# Patient Record
Sex: FEMALE | Race: WHITE | NOT HISPANIC OR LATINO
[De-identification: names, ages, dates, MRNs, and addresses within clinical notes are randomized per-mention and may not be internally consistent; named-entity substitution may affect disease eponyms.]

---

## 2017-01-03 ENCOUNTER — RECORDS - HEALTHEAST (OUTPATIENT)
Dept: ADMINISTRATIVE | Facility: OTHER | Age: 77
End: 2017-01-03

## 2017-01-04 ENCOUNTER — RECORDS - HEALTHEAST (OUTPATIENT)
Dept: ADMINISTRATIVE | Facility: OTHER | Age: 77
End: 2017-01-04

## 2017-01-24 ENCOUNTER — AMBULATORY - HEALTHEAST (OUTPATIENT)
Dept: NURSING | Facility: CLINIC | Age: 77
End: 2017-01-24

## 2017-02-15 ENCOUNTER — RECORDS - HEALTHEAST (OUTPATIENT)
Dept: ADMINISTRATIVE | Facility: OTHER | Age: 77
End: 2017-02-15

## 2017-02-20 ENCOUNTER — RECORDS - HEALTHEAST (OUTPATIENT)
Dept: ADMINISTRATIVE | Facility: OTHER | Age: 77
End: 2017-02-20

## 2017-02-23 ENCOUNTER — COMMUNICATION - HEALTHEAST (OUTPATIENT)
Dept: SCHEDULING | Facility: CLINIC | Age: 77
End: 2017-02-23

## 2017-03-03 ENCOUNTER — COMMUNICATION - HEALTHEAST (OUTPATIENT)
Dept: FAMILY MEDICINE | Facility: CLINIC | Age: 77
End: 2017-03-03

## 2017-03-09 ENCOUNTER — OFFICE VISIT - HEALTHEAST (OUTPATIENT)
Dept: FAMILY MEDICINE | Facility: CLINIC | Age: 77
End: 2017-03-09

## 2017-03-09 DIAGNOSIS — F02.81 ALZHEIMER'S DEMENTIA WITH BEHAVIORAL DISTURBANCE, UNSPECIFIED TIMING OF DEMENTIA ONSET: ICD-10-CM

## 2017-03-09 DIAGNOSIS — G30.9 ALZHEIMER'S DEMENTIA WITH BEHAVIORAL DISTURBANCE, UNSPECIFIED TIMING OF DEMENTIA ONSET: ICD-10-CM

## 2017-03-09 DIAGNOSIS — Z87.898 HISTORY OF SYNCOPE: ICD-10-CM

## 2017-03-09 DIAGNOSIS — E53.8 OTHER B-COMPLEX DEFICIENCIES: ICD-10-CM

## 2017-03-09 ASSESSMENT — MIFFLIN-ST. JEOR: SCORE: 1118.95

## 2017-03-10 ENCOUNTER — RECORDS - HEALTHEAST (OUTPATIENT)
Dept: ADMINISTRATIVE | Facility: OTHER | Age: 77
End: 2017-03-10

## 2017-03-14 ENCOUNTER — AMBULATORY - HEALTHEAST (OUTPATIENT)
Dept: NURSING | Facility: CLINIC | Age: 77
End: 2017-03-14

## 2017-03-14 DIAGNOSIS — E53.8 OTHER B-COMPLEX DEFICIENCIES: ICD-10-CM

## 2017-03-28 ENCOUNTER — COMMUNICATION - HEALTHEAST (OUTPATIENT)
Dept: FAMILY MEDICINE | Facility: CLINIC | Age: 77
End: 2017-03-28

## 2017-04-11 ENCOUNTER — AMBULATORY - HEALTHEAST (OUTPATIENT)
Dept: NURSING | Facility: CLINIC | Age: 77
End: 2017-04-11

## 2017-05-16 ENCOUNTER — AMBULATORY - HEALTHEAST (OUTPATIENT)
Dept: NURSING | Facility: CLINIC | Age: 77
End: 2017-05-16

## 2017-06-15 ENCOUNTER — COMMUNICATION - HEALTHEAST (OUTPATIENT)
Dept: FAMILY MEDICINE | Facility: CLINIC | Age: 77
End: 2017-06-15

## 2017-06-15 ENCOUNTER — AMBULATORY - HEALTHEAST (OUTPATIENT)
Dept: NURSING | Facility: CLINIC | Age: 77
End: 2017-06-15

## 2017-06-16 ENCOUNTER — COMMUNICATION - HEALTHEAST (OUTPATIENT)
Dept: FAMILY MEDICINE | Facility: CLINIC | Age: 77
End: 2017-06-16

## 2017-06-20 ENCOUNTER — COMMUNICATION - HEALTHEAST (OUTPATIENT)
Dept: FAMILY MEDICINE | Facility: CLINIC | Age: 77
End: 2017-06-20

## 2017-06-21 ENCOUNTER — OFFICE VISIT - HEALTHEAST (OUTPATIENT)
Dept: FAMILY MEDICINE | Facility: CLINIC | Age: 77
End: 2017-06-21

## 2017-06-21 DIAGNOSIS — G30.9 ALZHEIMER'S DEMENTIA WITH BEHAVIORAL DISTURBANCE, UNSPECIFIED TIMING OF DEMENTIA ONSET: ICD-10-CM

## 2017-06-21 DIAGNOSIS — F02.81 ALZHEIMER'S DEMENTIA WITH BEHAVIORAL DISTURBANCE, UNSPECIFIED TIMING OF DEMENTIA ONSET: ICD-10-CM

## 2017-06-21 ASSESSMENT — MIFFLIN-ST. JEOR: SCORE: 1181.32

## 2017-06-26 ENCOUNTER — RECORDS - HEALTHEAST (OUTPATIENT)
Dept: ADMINISTRATIVE | Facility: OTHER | Age: 77
End: 2017-06-26

## 2017-06-26 ENCOUNTER — COMMUNICATION - HEALTHEAST (OUTPATIENT)
Dept: FAMILY MEDICINE | Facility: CLINIC | Age: 77
End: 2017-06-26

## 2017-07-18 ENCOUNTER — AMBULATORY - HEALTHEAST (OUTPATIENT)
Dept: NURSING | Facility: CLINIC | Age: 77
End: 2017-07-18

## 2017-07-24 ENCOUNTER — RECORDS - HEALTHEAST (OUTPATIENT)
Dept: ADMINISTRATIVE | Facility: OTHER | Age: 77
End: 2017-07-24

## 2017-07-24 ENCOUNTER — COMMUNICATION - HEALTHEAST (OUTPATIENT)
Dept: FAMILY MEDICINE | Facility: CLINIC | Age: 77
End: 2017-07-24

## 2017-08-16 ENCOUNTER — RECORDS - HEALTHEAST (OUTPATIENT)
Dept: ADMINISTRATIVE | Facility: OTHER | Age: 77
End: 2017-08-16

## 2017-08-30 ENCOUNTER — AMBULATORY - HEALTHEAST (OUTPATIENT)
Dept: NURSING | Facility: CLINIC | Age: 77
End: 2017-08-30

## 2017-10-06 ENCOUNTER — COMMUNICATION - HEALTHEAST (OUTPATIENT)
Dept: FAMILY MEDICINE | Facility: CLINIC | Age: 77
End: 2017-10-06

## 2017-10-09 ENCOUNTER — COMMUNICATION - HEALTHEAST (OUTPATIENT)
Dept: FAMILY MEDICINE | Facility: CLINIC | Age: 77
End: 2017-10-09

## 2017-10-10 ENCOUNTER — COMMUNICATION - HEALTHEAST (OUTPATIENT)
Dept: FAMILY MEDICINE | Facility: CLINIC | Age: 77
End: 2017-10-10

## 2017-10-10 ENCOUNTER — RECORDS - HEALTHEAST (OUTPATIENT)
Dept: ADMINISTRATIVE | Facility: OTHER | Age: 77
End: 2017-10-10

## 2017-10-24 ENCOUNTER — AMBULATORY - HEALTHEAST (OUTPATIENT)
Dept: NURSING | Facility: CLINIC | Age: 77
End: 2017-10-24

## 2017-11-08 ENCOUNTER — RECORDS - HEALTHEAST (OUTPATIENT)
Dept: ADMINISTRATIVE | Facility: OTHER | Age: 77
End: 2017-11-08

## 2017-11-16 ENCOUNTER — COMMUNICATION - HEALTHEAST (OUTPATIENT)
Dept: FAMILY MEDICINE | Facility: CLINIC | Age: 77
End: 2017-11-16

## 2017-11-17 ENCOUNTER — RECORDS - HEALTHEAST (OUTPATIENT)
Dept: ADMINISTRATIVE | Facility: OTHER | Age: 77
End: 2017-11-17

## 2017-11-30 ENCOUNTER — OFFICE VISIT - HEALTHEAST (OUTPATIENT)
Dept: FAMILY MEDICINE | Facility: CLINIC | Age: 77
End: 2017-11-30

## 2017-11-30 ENCOUNTER — RECORDS - HEALTHEAST (OUTPATIENT)
Dept: GENERAL RADIOLOGY | Facility: CLINIC | Age: 77
End: 2017-11-30

## 2017-11-30 DIAGNOSIS — G30.9 ALZHEIMER'S DEMENTIA (H): ICD-10-CM

## 2017-11-30 DIAGNOSIS — F02.80 ALZHEIMER'S DEMENTIA (H): ICD-10-CM

## 2017-11-30 DIAGNOSIS — M65.311 TRIGGER THUMB OF RIGHT HAND: ICD-10-CM

## 2017-11-30 DIAGNOSIS — M79.644 PAIN IN RIGHT FINGER(S): ICD-10-CM

## 2017-11-30 DIAGNOSIS — M79.644 PAIN OF RIGHT THUMB: ICD-10-CM

## 2017-11-30 ASSESSMENT — MIFFLIN-ST. JEOR: SCORE: 1156.09

## 2017-12-04 ENCOUNTER — COMMUNICATION - HEALTHEAST (OUTPATIENT)
Dept: FAMILY MEDICINE | Facility: CLINIC | Age: 77
End: 2017-12-04

## 2017-12-05 ENCOUNTER — RECORDS - HEALTHEAST (OUTPATIENT)
Dept: ADMINISTRATIVE | Facility: OTHER | Age: 77
End: 2017-12-05

## 2018-01-18 ENCOUNTER — RECORDS - HEALTHEAST (OUTPATIENT)
Dept: ADMINISTRATIVE | Facility: OTHER | Age: 78
End: 2018-01-18

## 2018-01-18 ENCOUNTER — COMMUNICATION - HEALTHEAST (OUTPATIENT)
Dept: FAMILY MEDICINE | Facility: CLINIC | Age: 78
End: 2018-01-18

## 2018-04-09 ENCOUNTER — COMMUNICATION - HEALTHEAST (OUTPATIENT)
Dept: FAMILY MEDICINE | Facility: CLINIC | Age: 78
End: 2018-04-09

## 2018-05-31 ENCOUNTER — RECORDS - HEALTHEAST (OUTPATIENT)
Dept: ADMINISTRATIVE | Facility: OTHER | Age: 78
End: 2018-05-31

## 2018-07-05 ENCOUNTER — COMMUNICATION - HEALTHEAST (OUTPATIENT)
Dept: FAMILY MEDICINE | Facility: CLINIC | Age: 78
End: 2018-07-05

## 2018-07-12 ENCOUNTER — RECORDS - HEALTHEAST (OUTPATIENT)
Dept: ADMINISTRATIVE | Facility: OTHER | Age: 78
End: 2018-07-12

## 2018-08-21 ENCOUNTER — RECORDS - HEALTHEAST (OUTPATIENT)
Dept: ADMINISTRATIVE | Facility: OTHER | Age: 78
End: 2018-08-21

## 2018-08-21 ENCOUNTER — OFFICE VISIT - HEALTHEAST (OUTPATIENT)
Dept: FAMILY MEDICINE | Facility: CLINIC | Age: 78
End: 2018-08-21

## 2018-08-21 DIAGNOSIS — N39.498 OTHER URINARY INCONTINENCE: ICD-10-CM

## 2018-08-21 DIAGNOSIS — G30.1 LATE ONSET ALZHEIMER'S DISEASE WITH BEHAVIORAL DISTURBANCE (H): ICD-10-CM

## 2018-08-21 DIAGNOSIS — Z51.81 MEDICATION MONITORING ENCOUNTER: ICD-10-CM

## 2018-08-21 DIAGNOSIS — F02.818 LATE ONSET ALZHEIMER'S DISEASE WITH BEHAVIORAL DISTURBANCE (H): ICD-10-CM

## 2018-08-21 DIAGNOSIS — R68.89 FLUCTUATION OF WEIGHT: ICD-10-CM

## 2018-08-21 DIAGNOSIS — R60.0 EDEMA OF BOTH LEGS: ICD-10-CM

## 2018-08-21 DIAGNOSIS — Z00.00 MEDICARE ANNUAL WELLNESS VISIT, SUBSEQUENT: ICD-10-CM

## 2018-08-21 LAB
ALBUMIN SERPL-MCNC: 3.9 G/DL (ref 3.5–5)
ALP SERPL-CCNC: 56 U/L (ref 45–120)
ALT SERPL W P-5'-P-CCNC: 17 U/L (ref 0–45)
ANION GAP SERPL CALCULATED.3IONS-SCNC: 8 MMOL/L (ref 5–18)
AST SERPL W P-5'-P-CCNC: 18 U/L (ref 0–40)
BILIRUB SERPL-MCNC: 0.5 MG/DL (ref 0–1)
BUN SERPL-MCNC: 11 MG/DL (ref 8–28)
CALCIUM SERPL-MCNC: 9.7 MG/DL (ref 8.5–10.5)
CHLORIDE BLD-SCNC: 105 MMOL/L (ref 98–107)
CO2 SERPL-SCNC: 27 MMOL/L (ref 22–31)
CREAT SERPL-MCNC: 0.8 MG/DL (ref 0.6–1.1)
ERYTHROCYTE [DISTWIDTH] IN BLOOD BY AUTOMATED COUNT: 14.5 % (ref 11–14.5)
GFR SERPL CREATININE-BSD FRML MDRD: >60 ML/MIN/1.73M2
GLUCOSE BLD-MCNC: 107 MG/DL (ref 70–125)
HCT VFR BLD AUTO: 35.5 % (ref 35–47)
HGB BLD-MCNC: 12 G/DL (ref 12–16)
MCH RBC QN AUTO: 28.5 PG (ref 27–34)
MCHC RBC AUTO-ENTMCNC: 33.8 G/DL (ref 32–36)
MCV RBC AUTO: 84 FL (ref 80–100)
PLATELET # BLD AUTO: 240 THOU/UL (ref 140–440)
PMV BLD AUTO: 7.8 FL (ref 7–10)
POTASSIUM BLD-SCNC: 4.4 MMOL/L (ref 3.5–5)
PROT SERPL-MCNC: 6.5 G/DL (ref 6–8)
RBC # BLD AUTO: 4.21 MILL/UL (ref 3.8–5.4)
SODIUM SERPL-SCNC: 140 MMOL/L (ref 136–145)
WBC: 4.3 THOU/UL (ref 4–11)

## 2018-08-21 RX ORDER — QUETIAPINE FUMARATE 100 MG/1
100 TABLET, FILM COATED ORAL AT BEDTIME
Status: SHIPPED | COMMUNITY
Start: 2018-08-21

## 2018-08-21 RX ORDER — TRAZODONE HYDROCHLORIDE 50 MG/1
50 TABLET, FILM COATED ORAL AT BEDTIME
Status: SHIPPED | COMMUNITY
Start: 2018-08-21

## 2018-08-21 RX ORDER — LORAZEPAM 0.5 MG/1
0.5 TABLET ORAL EVERY 12 HOURS PRN
Status: SHIPPED | COMMUNITY
Start: 2018-08-21

## 2018-08-21 ASSESSMENT — MIFFLIN-ST. JEOR: SCORE: 1099.95

## 2018-08-24 ENCOUNTER — COMMUNICATION - HEALTHEAST (OUTPATIENT)
Dept: FAMILY MEDICINE | Facility: CLINIC | Age: 78
End: 2018-08-24

## 2018-10-31 ENCOUNTER — COMMUNICATION - HEALTHEAST (OUTPATIENT)
Dept: FAMILY MEDICINE | Facility: CLINIC | Age: 78
End: 2018-10-31

## 2018-11-02 ENCOUNTER — COMMUNICATION - HEALTHEAST (OUTPATIENT)
Dept: FAMILY MEDICINE | Facility: CLINIC | Age: 78
End: 2018-11-02

## 2018-11-02 ENCOUNTER — RECORDS - HEALTHEAST (OUTPATIENT)
Dept: ADMINISTRATIVE | Facility: OTHER | Age: 78
End: 2018-11-02

## 2018-12-12 ENCOUNTER — COMMUNICATION - HEALTHEAST (OUTPATIENT)
Dept: FAMILY MEDICINE | Facility: CLINIC | Age: 78
End: 2018-12-12

## 2018-12-18 ENCOUNTER — RECORDS - HEALTHEAST (OUTPATIENT)
Dept: ADMINISTRATIVE | Facility: OTHER | Age: 78
End: 2018-12-18

## 2018-12-28 ENCOUNTER — OFFICE VISIT - HEALTHEAST (OUTPATIENT)
Dept: FAMILY MEDICINE | Facility: CLINIC | Age: 78
End: 2018-12-28

## 2018-12-28 DIAGNOSIS — G30.1 LATE ONSET ALZHEIMER'S DISEASE WITH BEHAVIORAL DISTURBANCE (H): ICD-10-CM

## 2018-12-28 DIAGNOSIS — R63.4 WEIGHT LOSS: ICD-10-CM

## 2018-12-28 DIAGNOSIS — F02.818 LATE ONSET ALZHEIMER'S DISEASE WITH BEHAVIORAL DISTURBANCE (H): ICD-10-CM

## 2018-12-28 LAB
ALBUMIN SERPL-MCNC: 3.9 G/DL (ref 3.5–5)
ALP SERPL-CCNC: 71 U/L (ref 45–120)
ALT SERPL W P-5'-P-CCNC: 16 U/L (ref 0–45)
ANION GAP SERPL CALCULATED.3IONS-SCNC: 8 MMOL/L (ref 5–18)
AST SERPL W P-5'-P-CCNC: 21 U/L (ref 0–40)
BILIRUB SERPL-MCNC: 0.6 MG/DL (ref 0–1)
BUN SERPL-MCNC: 16 MG/DL (ref 8–28)
CALCIUM SERPL-MCNC: 9.8 MG/DL (ref 8.5–10.5)
CHLORIDE BLD-SCNC: 101 MMOL/L (ref 98–107)
CO2 SERPL-SCNC: 29 MMOL/L (ref 22–31)
CREAT SERPL-MCNC: 0.74 MG/DL (ref 0.6–1.1)
ERYTHROCYTE [DISTWIDTH] IN BLOOD BY AUTOMATED COUNT: 12 % (ref 11–14.5)
GFR SERPL CREATININE-BSD FRML MDRD: >60 ML/MIN/1.73M2
GLUCOSE BLD-MCNC: 102 MG/DL (ref 70–125)
HCT VFR BLD AUTO: 35.9 % (ref 35–47)
HGB BLD-MCNC: 11.9 G/DL (ref 12–16)
MCH RBC QN AUTO: 29.8 PG (ref 27–34)
MCHC RBC AUTO-ENTMCNC: 33.2 G/DL (ref 32–36)
MCV RBC AUTO: 90 FL (ref 80–100)
PLATELET # BLD AUTO: 258 THOU/UL (ref 140–440)
PMV BLD AUTO: 7.3 FL (ref 7–10)
POTASSIUM BLD-SCNC: 4.3 MMOL/L (ref 3.5–5)
PROT SERPL-MCNC: 6.7 G/DL (ref 6–8)
RBC # BLD AUTO: 4 MILL/UL (ref 3.8–5.4)
SODIUM SERPL-SCNC: 138 MMOL/L (ref 136–145)
TSH SERPL DL<=0.005 MIU/L-ACNC: 2.72 UIU/ML (ref 0.3–5)
WBC: 4.5 THOU/UL (ref 4–11)

## 2018-12-28 RX ORDER — NUTRIT SUPP/INULIN/FOS/FIBER 0.05G-1.06
1 LIQUID (ML) ORAL 2 TIMES DAILY WITH MEALS
Qty: 60 CAN | Refills: 5 | Status: SHIPPED | OUTPATIENT
Start: 2018-12-28

## 2018-12-28 ASSESSMENT — MIFFLIN-ST. JEOR: SCORE: 1045.24

## 2018-12-31 ENCOUNTER — COMMUNICATION - HEALTHEAST (OUTPATIENT)
Dept: FAMILY MEDICINE | Facility: CLINIC | Age: 78
End: 2018-12-31

## 2019-01-16 ENCOUNTER — COMMUNICATION - HEALTHEAST (OUTPATIENT)
Dept: FAMILY MEDICINE | Facility: CLINIC | Age: 79
End: 2019-01-16

## 2019-01-18 ENCOUNTER — COMMUNICATION - HEALTHEAST (OUTPATIENT)
Dept: FAMILY MEDICINE | Facility: CLINIC | Age: 79
End: 2019-01-18

## 2019-02-14 ENCOUNTER — COMMUNICATION - HEALTHEAST (OUTPATIENT)
Dept: FAMILY MEDICINE | Facility: CLINIC | Age: 79
End: 2019-02-14

## 2019-02-15 ENCOUNTER — COMMUNICATION - HEALTHEAST (OUTPATIENT)
Dept: FAMILY MEDICINE | Facility: CLINIC | Age: 79
End: 2019-02-15

## 2019-02-15 DIAGNOSIS — G30.1 LATE ONSET ALZHEIMER'S DISEASE WITH BEHAVIORAL DISTURBANCE (H): ICD-10-CM

## 2019-02-15 DIAGNOSIS — F02.818 LATE ONSET ALZHEIMER'S DISEASE WITH BEHAVIORAL DISTURBANCE (H): ICD-10-CM

## 2019-02-19 ENCOUNTER — COMMUNICATION - HEALTHEAST (OUTPATIENT)
Dept: FAMILY MEDICINE | Facility: CLINIC | Age: 79
End: 2019-02-19

## 2019-02-19 ENCOUNTER — RECORDS - HEALTHEAST (OUTPATIENT)
Dept: ADMINISTRATIVE | Facility: OTHER | Age: 79
End: 2019-02-19

## 2019-02-21 ENCOUNTER — COMMUNICATION - HEALTHEAST (OUTPATIENT)
Dept: FAMILY MEDICINE | Facility: CLINIC | Age: 79
End: 2019-02-21

## 2019-02-26 ENCOUNTER — COMMUNICATION - HEALTHEAST (OUTPATIENT)
Dept: FAMILY MEDICINE | Facility: CLINIC | Age: 79
End: 2019-02-26

## 2019-02-26 ENCOUNTER — RECORDS - HEALTHEAST (OUTPATIENT)
Dept: ADMINISTRATIVE | Facility: OTHER | Age: 79
End: 2019-02-26

## 2019-02-28 ENCOUNTER — RECORDS - HEALTHEAST (OUTPATIENT)
Dept: ADMINISTRATIVE | Facility: OTHER | Age: 79
End: 2019-02-28

## 2019-03-01 ENCOUNTER — COMMUNICATION - HEALTHEAST (OUTPATIENT)
Dept: FAMILY MEDICINE | Facility: CLINIC | Age: 79
End: 2019-03-01

## 2019-03-04 ENCOUNTER — RECORDS - HEALTHEAST (OUTPATIENT)
Dept: ADMINISTRATIVE | Facility: OTHER | Age: 79
End: 2019-03-04

## 2019-03-04 ENCOUNTER — COMMUNICATION - HEALTHEAST (OUTPATIENT)
Dept: FAMILY MEDICINE | Facility: CLINIC | Age: 79
End: 2019-03-04

## 2019-03-05 ENCOUNTER — COMMUNICATION - HEALTHEAST (OUTPATIENT)
Dept: FAMILY MEDICINE | Facility: CLINIC | Age: 79
End: 2019-03-05

## 2019-03-05 ENCOUNTER — RECORDS - HEALTHEAST (OUTPATIENT)
Dept: ADMINISTRATIVE | Facility: OTHER | Age: 79
End: 2019-03-05

## 2019-03-07 ENCOUNTER — AMBULATORY - HEALTHEAST (OUTPATIENT)
Dept: OTHER | Facility: CLINIC | Age: 79
End: 2019-03-07

## 2019-03-19 ENCOUNTER — COMMUNICATION - HEALTHEAST (OUTPATIENT)
Dept: FAMILY MEDICINE | Facility: CLINIC | Age: 79
End: 2019-03-19

## 2019-03-19 ENCOUNTER — RECORDS - HEALTHEAST (OUTPATIENT)
Dept: ADMINISTRATIVE | Facility: OTHER | Age: 79
End: 2019-03-19

## 2019-03-20 ENCOUNTER — COMMUNICATION - HEALTHEAST (OUTPATIENT)
Dept: ADMINISTRATIVE | Facility: CLINIC | Age: 79
End: 2019-03-20

## 2019-04-23 ENCOUNTER — COMMUNICATION - HEALTHEAST (OUTPATIENT)
Dept: FAMILY MEDICINE | Facility: CLINIC | Age: 79
End: 2019-04-23

## 2019-05-06 ENCOUNTER — RECORDS - HEALTHEAST (OUTPATIENT)
Dept: LAB | Facility: CLINIC | Age: 79
End: 2019-05-06

## 2019-05-07 LAB
25(OH)D3 SERPL-MCNC: 39.5 NG/ML (ref 30–80)
ALBUMIN SERPL-MCNC: 3.6 G/DL (ref 3.5–5)
ALP SERPL-CCNC: 65 U/L (ref 45–120)
ALT SERPL W P-5'-P-CCNC: 20 U/L (ref 0–45)
ANION GAP SERPL CALCULATED.3IONS-SCNC: 7 MMOL/L (ref 5–18)
AST SERPL W P-5'-P-CCNC: 30 U/L (ref 0–40)
BILIRUB SERPL-MCNC: 0.4 MG/DL (ref 0–1)
BUN SERPL-MCNC: 23 MG/DL (ref 8–28)
CALCIUM SERPL-MCNC: 9.8 MG/DL (ref 8.5–10.5)
CHLORIDE BLD-SCNC: 108 MMOL/L (ref 98–107)
CO2 SERPL-SCNC: 28 MMOL/L (ref 22–31)
CREAT SERPL-MCNC: 0.81 MG/DL (ref 0.6–1.1)
ERYTHROCYTE [DISTWIDTH] IN BLOOD BY AUTOMATED COUNT: 13.3 % (ref 11–14.5)
GFR SERPL CREATININE-BSD FRML MDRD: >60 ML/MIN/1.73M2
GLUCOSE BLD-MCNC: 103 MG/DL (ref 70–125)
HCT VFR BLD AUTO: 35 % (ref 35–47)
HGB BLD-MCNC: 10.9 G/DL (ref 12–16)
MCH RBC QN AUTO: 29.1 PG (ref 27–34)
MCHC RBC AUTO-ENTMCNC: 31.1 G/DL (ref 32–36)
MCV RBC AUTO: 93 FL (ref 80–100)
PLATELET # BLD AUTO: 236 THOU/UL (ref 140–440)
PMV BLD AUTO: 10.7 FL (ref 8.5–12.5)
POTASSIUM BLD-SCNC: 4 MMOL/L (ref 3.5–5)
PROT SERPL-MCNC: 6.6 G/DL (ref 6–8)
RBC # BLD AUTO: 3.75 MILL/UL (ref 3.8–5.4)
SODIUM SERPL-SCNC: 143 MMOL/L (ref 136–145)
TSH SERPL DL<=0.005 MIU/L-ACNC: 1.54 UIU/ML (ref 0.3–5)
VIT B12 SERPL-MCNC: 1042 PG/ML (ref 213–816)
WBC: 3.8 THOU/UL (ref 4–11)

## 2019-10-09 ENCOUNTER — RECORDS - HEALTHEAST (OUTPATIENT)
Dept: LAB | Facility: CLINIC | Age: 79
End: 2019-10-09

## 2019-10-09 LAB
ANION GAP SERPL CALCULATED.3IONS-SCNC: 6 MMOL/L (ref 5–18)
BUN SERPL-MCNC: 20 MG/DL (ref 8–28)
CALCIUM SERPL-MCNC: 9.7 MG/DL (ref 8.5–10.5)
CHLORIDE BLD-SCNC: 107 MMOL/L (ref 98–107)
CO2 SERPL-SCNC: 30 MMOL/L (ref 22–31)
CREAT SERPL-MCNC: 0.76 MG/DL (ref 0.6–1.1)
ERYTHROCYTE [DISTWIDTH] IN BLOOD BY AUTOMATED COUNT: 13.5 % (ref 11–14.5)
GFR SERPL CREATININE-BSD FRML MDRD: >60 ML/MIN/1.73M2
GLUCOSE BLD-MCNC: 105 MG/DL (ref 70–125)
HCT VFR BLD AUTO: 38.4 % (ref 35–47)
HGB BLD-MCNC: 11.6 G/DL (ref 12–16)
MCH RBC QN AUTO: 28.9 PG (ref 27–34)
MCHC RBC AUTO-ENTMCNC: 30.2 G/DL (ref 32–36)
MCV RBC AUTO: 96 FL (ref 80–100)
PLATELET # BLD AUTO: 239 THOU/UL (ref 140–440)
PMV BLD AUTO: 10.9 FL (ref 8.5–12.5)
POTASSIUM BLD-SCNC: 4.1 MMOL/L (ref 3.5–5)
RBC # BLD AUTO: 4.02 MILL/UL (ref 3.8–5.4)
SODIUM SERPL-SCNC: 143 MMOL/L (ref 136–145)
VIT B12 SERPL-MCNC: 637 PG/ML (ref 213–816)
WBC: 4.9 THOU/UL (ref 4–11)

## 2019-12-06 ENCOUNTER — COMMUNICATION - HEALTHEAST (OUTPATIENT)
Dept: FAMILY MEDICINE | Facility: CLINIC | Age: 79
End: 2019-12-06

## 2020-05-09 ENCOUNTER — RECORDS - HEALTHEAST (OUTPATIENT)
Dept: LAB | Facility: CLINIC | Age: 80
End: 2020-05-09

## 2020-05-09 LAB
ALBUMIN UR-MCNC: ABNORMAL MG/DL
APPEARANCE UR: ABNORMAL
BACTERIA #/AREA URNS HPF: ABNORMAL HPF
BILIRUB UR QL STRIP: NEGATIVE
COLOR UR AUTO: YELLOW
GLUCOSE UR STRIP-MCNC: NEGATIVE MG/DL
HGB UR QL STRIP: NEGATIVE
KETONES UR STRIP-MCNC: ABNORMAL MG/DL
LEUKOCYTE ESTERASE UR QL STRIP: ABNORMAL
MUCOUS THREADS #/AREA URNS LPF: ABNORMAL LPF
NITRATE UR QL: NEGATIVE
PH UR STRIP: 6.5 [PH] (ref 4.5–8)
RBC #/AREA URNS AUTO: ABNORMAL HPF
SP GR UR STRIP: 1.01 (ref 1–1.03)
SQUAMOUS #/AREA URNS AUTO: ABNORMAL LPF
TRANS CELLS #/AREA URNS HPF: ABNORMAL LPF
UROBILINOGEN UR STRIP-ACNC: ABNORMAL
WBC #/AREA URNS AUTO: ABNORMAL HPF

## 2020-05-11 LAB — BACTERIA SPEC CULT: ABNORMAL

## 2021-05-28 NOTE — TELEPHONE ENCOUNTER
Medication Request  Medication name:   LORazepam (ATIVAN) 0.5 MG tablet        Sig - Route: Take 0.5 mg by mouth every 12 (twelve) hours as needed for anxiety. - Oral    Class: Historical Med        Pharmacy Name and Location: [facility]  Reason for request: Fax received from pharmacy requesting refill for the above medication  When did you use medication last?:  Unknown- listed as historical med  Patient offered appointment:  N/A  Okay to leave a detailed message: no-speak to pharmacy staff

## 2021-05-30 ENCOUNTER — RECORDS - HEALTHEAST (OUTPATIENT)
Dept: ADMINISTRATIVE | Facility: CLINIC | Age: 81
End: 2021-05-30

## 2021-05-30 VITALS — WEIGHT: 132.38 LBS | BODY MASS INDEX: 20.06 KG/M2 | HEIGHT: 68 IN

## 2021-05-31 VITALS — BODY MASS INDEX: 21.44 KG/M2 | HEIGHT: 68 IN | WEIGHT: 141.44 LBS

## 2021-05-31 VITALS — HEIGHT: 68 IN | BODY MASS INDEX: 22.28 KG/M2 | WEIGHT: 147 LBS

## 2021-06-01 VITALS — BODY MASS INDEX: 19.56 KG/M2 | WEIGHT: 129.06 LBS | HEIGHT: 68 IN

## 2021-06-02 VITALS — WEIGHT: 117 LBS | HEIGHT: 68 IN | BODY MASS INDEX: 17.73 KG/M2

## 2021-06-09 NOTE — PROGRESS NOTES
Assessment:       1. History of syncope     2. Alzheimer's dementia with behavioral disturbance, unspecified timing of dementia onset     3. Vitamin B12 Deficiency             Plan:        We reviewed the records from her hospital stay, and they will continue monitoring for additional signs/symptoms. Blood pressure is normal. We reviewed her current medications and she will continue the same pending additional lab results.  We reviewed dietary recommendations, including low salt and high fiber diet, and recommendations for regular exercise/activity. They will plan to follow up in 4-6 mos for repeat fasting labs and med check, sooner if any difficulties.         Subjective:          Patient is here for follow-up of recent hospital admission for a syncopal episode. Her  reports that she had called out while in the bathroom and he came in to find her on the floor in front of the doorway. She had been started on low dose seroquel a few days prior and had taken a dose shortly before the episode. They presented to the ER and she had a positive D-dimer and a negative head CT and CT angio of chest. She was admitted for overnight observation, serial enzymes, and PT and OT evaluations. She was discharged after an unremarkable workup. As far as her dementia, her  reports that she has gotten progressively worse. She will be attending a day program 3x weekly. She has had no unusual symptoms since being back home. They are looking into long-term memory care placement.       Associated signs and symptoms: none. Denies chest pain, dyspnea, peripheral edema and tiredness/fatigue. Weight trend: fluctuating a bit. She is currently taking aricept and namenda. Current side effects include: none. She was instructed to stop the seroquel at hospital d/c. Previous history of cardiac disease includes: none.    Review of Systems  Pertinent items are noted in HPI.        Objective:      Visit Vitals     /58     Pulse 84      "Ht 5' 8\" (1.727 m)     Wt 132 lb 6 oz (60 kg)     BMI 20.13 kg/m2      General:    Alert, cooperative, no distress   Head:    Normocephalic, without obvious abnormality, atraumatic   Eyes:    PERRL, conjunctiva/corneas clear, EOM's intact    Ears:    Normal TM's and external ear canals, both ears   Nose:   Nares normal, mucosa normal, no drainage or sinus tenderness   Throat:   Lips, mucosa, and tongue normal; teeth and gums normal   Neck:   Supple, symmetrical,  no adenopathy;  thyroid:  normal   Back:     Symmetric, ROM normal, no CVA tenderness   Lungs:     Clear to auscultation bilaterally, respirations unlabored   CV:    Regular rate and rhythm   Abdomen:     Soft, non-tender, no masses, no organomegaly   Extremities:   Extremities normal, atraumatic, no cyanosis or edema   Pulses:   2+ and symmetric all extremities   Skin:   Skin color, texture, turgor normal, no rashes or lesions   Neurologic:   normal strength and tone throughout          "

## 2021-06-11 NOTE — PROGRESS NOTES
PROGRESS NOTE         SUBJECTIVE:  Dedra Benjamin is a 77 y.o. female that presents with her  Saqib. Saqib sent a hand written letter to me the day before I was scheduled to see his wife today. He is very worried about the decline with his wife's memory loss related to her Alzheimer's. His neurologist is Dr. Cheney. Dr. Cheney is currently on vacation and will not be back for 3 weeks per the nurse that Saqib spoke with. The  is arranging to have his wife placed in a memory care unit. However, her bed will not be available for 5 to 6 weeks. In the meantime, she attends an adult  4 hours per day. She was going full time but due to her recent outbursts, increase in anxiety and inability of the staff to be able to safely manage the patient, they did have to limit her time at that facility during the day. Saqib is wondering if the patient's current Seroquel dose could be increased today so that the patient does not lose her spot in the adult  program. He is scheduled to meet with Dr. Cehney in August to discuss ongoing care for his wife. Patient's last fall was in February 2017.She completed an overnight stay in the hospital and has additional testing completed to rule out any acute bleeding etc. Patient cannot recall the year, month or current location she is at. She does not use any assistive devices when walking and follows simple commands.   Chief Complaint   Patient presents with     Medication Management         Patient Active Problem List   Diagnosis     Ganglion Of The Flexor Sheath Of The Right Thumb     Cognitive Functions Current Level Impaired Mild     Vitamin B12 Deficiency     Memory Lapses Or Loss     Localized Primary Osteoarthritis Of The Carpometacarpal Joint     Syncope     Alzheimer's dementia with behavioral disturbance, unspecified timing of dementia onset     Fall, initial encounter       Current Outpatient Prescriptions   Medication Sig Dispense Refill     cyanocobalamin 1,000  mcg/mL injection Inject 1,000 mcg into the shoulder, thigh, or buttocks every 30 (thirty) days.        donepezil (ARICEPT) 10 MG tablet Take 15 mg by mouth bedtime.       memantine (NAMENDA) 10 MG tablet Take 1 tablet (10 mg total) by mouth 2 (two) times a day.  0     multivitamin therapeutic (THERAGRAN) tablet Take 1 tablet by mouth daily.       QUEtiapine (SEROQUEL) 50 MG tablet Take 2 tablets (100 mg total) by mouth 3 (three) times a day. 540 tablet 0     Current Facility-Administered Medications   Medication Dose Route Frequency Provider Last Rate Last Dose     cyanocobalamin injection 1,000 mcg  1,000 mcg Intramuscular Q30 Days Ashwini Daley MD   1,000 mcg at 06/15/17 1112       History   Smoking Status     Never Smoker   Smokeless Tobacco     Not on file       REVIEW OF SYSTEMS: Excessive worries, restlessness, on edge, easily fatigues, irritability, disturbed sleep, decreased concentration, muscle tension, fear of health.      TOBACCO USE:  History   Smoking Status     Never Smoker   Smokeless Tobacco     Not on file     Social History     Social History     Marital status:      Spouse name: Saqib     Number of children: 2     Years of education: 16     Occupational History     retired      Social History Main Topics     Smoking status: Never Smoker     Smokeless tobacco: Not on file     Alcohol use Yes     Drug use: No     Sexual activity: Not Currently     Partners: Male     Other Topics Concern     Not on file     Social History Narrative         OBJECTIVE:     Well developed, well nourished, no acute distress.  HEENT: normocephalic/atraumatic, PERRLA/EOMI, TMs: Gray, normal light reflex, no nasal discharge.  Oral mucosa: no erythema/exudate  Neck: No LAD/masses/thyromegaly/bruits  Lungs: clear bilaterally in all lung fields  Heart: regular rate and rhythm, no murmurs/gallops/rubs  Abdomen: Normal bowel sounds in all quadrants, soft, non-tender, non-distended, no masses, neg  Coats's/McBurney's, no rebound/guarding  Neuro: A&O x 1, able to tell me her name,Strength 5/5, reflexes symmetric, sensory intact to light touch. DTR's intact. Negative Romberg.   Psych: Behavior distracted, intermittently engaged in conversation. Gets frustrated easily when she cannot figure something out. Re-directed with assist of .   Musculoskeletal: no gross deformities, MEREDITH's, transfers from chair to exam table with minimal assist  Skin: no rashes or lesions.               Vitals:    06/21/17 1357   BP: 106/62   Pulse: 72     Weight: 147 lb (66.7 kg)  Wt Readings from Last 3 Encounters:   06/21/17 147 lb (66.7 kg)   03/09/17 132 lb 6 oz (60 kg)   02/22/17 124 lb 4.8 oz (56.4 kg)     Body mass index is 22.52 kg/(m^2).      ASSESSMENT/PLAN:     I have had an Advance Directives discussion with the patient.  The following are part of a depression follow up plan for the patient:  under care of mental health counselor, coping support assessment and emotional support assessment  The following low BMI interventions were performed this visit: dietary management education, guidance, and counseling, patient's is normal today. No option for normal BMI in quality measure tab.     1. Alzheimer's dementia with behavioral disturbance, unspecified timing of dementia onset    - QUEtiapine (SEROQUEL) 50 MG tablet; Take 2 tablets (100 mg total) by mouth 3 (three) times a day.  Dispense: 540 tablet; Refill: 0      There are no Patient Instructions on file for this visit.  Medications Discontinued During This Encounter   Medication Reason     QUEtiapine (SEROQUEL) 25 MG tablet Dose adjustment     Follow up with Dr. Cheney from Neurology as scheduled in August/September for next medication check. Seroquel dose increased from 225 mg to 300 mg today due to patient's recent outbursts and inability to re-direct her at her adult  facility. (See above dose changes). Due to her recent behaviors, her hours per day have been  reduced from 8 hours down to 4 hours at the [facility]. Her behavior has somewhat improved since her hours have been reduced, however, staff at [facility] continue to be concerned with their safety, the other residents safety as well as the patient's  when she has an emotional outburst. When her behavior is manageable at [facility], she enjoys working on puzzles and sewing. Health Maintenance completed: Falls assessment and Advanced Directives discussed. Mini Cog performed today. Patient was unable to successfully draw clock with hands pointing to 4 p.m. Patient deshawn a clock with the time ranging from 12 to 9 p.m. She ended up drawing the number 100 in the middle. When asked if she could draw the hands pointing to 4 p.m, she deshawn an arrow outside of the clock pointing to the number 4. She was unable to recall any of the 3 words that I gave her.     The visit lasted a total of 25 minutes face to face with the patient.  Over 50% of the time spent counseling and educating the patient about the above content.      Ilene Ospina NP

## 2021-06-14 NOTE — PROGRESS NOTES
"Assessment:     1. Pain of right thumb  XR Finger Right 2 or More VWS    Ambulatory referral to Orthopedics   2. Trigger thumb of right hand  Ambulatory referral to Orthopedics   3. Alzheimer's dementia  Ambulatory referral to Orthopedics         Plan:          We reviewed the likely/potential etiology(ies) for her thumb symptoms and we will check a plain x-ray and refer to [facility] for presumed trigger finger and possible injetion. Xray shows no significant fracture or abnormalities. We reviewed use of OTC analgesics and she will call or return to clinic with any ongoing or worsening symptoms. She will otherwise will f/u in 2-3 mos for routine evaluation.       Subjective:      Dedra Benjamin is an 77 y.o. female who presents for evaluation of right thumb pain. Onset was gradual, starting about 1 month ago. The pain is moderate, worsens with movement, and is relieved by rest, although the patient has limited cognitive skills due to dementia. She denies any injury, but may not remember a fall, or may not have remembered to tell staff. There is no associated erythema, but the joint IP joint is moderately swollen compared to the other side. It sounds as though she may have some triggering symptoms vs clicking at times as is well. Evaluation to date: none. Treatment to date: nothing specific.    The following portions of the patient's history were reviewed and updated as appropriate: allergies, current medications, past family history, past medical history, past social history, past surgical history and problem list.    Review of Systems  Pertinent items are noted in HPI.     Objective:      /72  Pulse 72  Ht 5' 7.75\" (1.721 m)  Wt 141 lb 7 oz (64.2 kg)   BMI 21.66 kg/m2  Physical Exam:  GEN: Alert and oriented, NAD,  well nourished  SKIN:  Normal skin turgor, no lesions/rashes   HEENT: moist mucous membranes, no rhinorrhea.    NECK: Normal.  No adenopathy or thyromegaly.  CV: Regular rate and rhythm, no " murmurs.   LUNGS: Clear to auscultation bilaterally.    ABDOMEN: Soft, non-tender, non-distended, no masses   EXTREMITY: No edema, cyanosis  NEURO: Grossly normal.         Imaging:  Xr Finger Right 2 Or More Vws Result Date: 11/30/2017  XR FINGER RIGHT 2 OR MORE VWS 11/30/2017 4:39 PM INDICATION: thumb pain and sticking COMPARISON: None. FINDINGS: Normal alignment without a fracture. Degenerative changes are seen within the thumb, greatest along the radial aspect of the interphalangeal joint where there is severe narrowing and mild spur formation. This report was electronically interpreted by: Dr. Calli Colin MD ON 11/30/2017 at 17:51

## 2021-06-16 PROBLEM — R60.0 EDEMA OF BOTH LEGS: Status: ACTIVE | Noted: 2018-08-29

## 2021-06-16 NOTE — TELEPHONE ENCOUNTER
Telephone Encounter by Crystal Liriano at 1/21/2019 12:10 PM     Author: Crystal Liriano Service: -- Author Type: --    Filed: 1/21/2019 12:11 PM Encounter Date: 1/16/2019 Status: Signed    : Crystal Liriano       [facility] team  [phone]    PA has been initiated.

## 2021-06-16 NOTE — TELEPHONE ENCOUNTER
Telephone Encounter by Crystal Liriano at 1/22/2019 10:07 AM     Author: Crystal Liriano Service: -- Author Type: --    Filed: 1/22/2019 10:07 AM Encounter Date: 1/16/2019 Status: Signed    : Crystal Liriano APPROVED:    Approval start date: 1/21/19  Approval end date: 1/21/2021    Pharmacy has been notified of approval and will contact patient when medication is ready for pickup.

## 2021-06-19 NOTE — LETTER
Letter by Ashwini Daley MD at      Author: Ashwini Daley MD Service: -- Author Type: --    Filed:  Encounter Date: 3/20/2019 Status: (Other)        [facility] PATIENT ACCESS  [address] [facility] [address]  [phone]         Dedra Benjamin  [address]  [place] [address]        03/20/19    Dear Dedra Benjamin,     At [facility] we care about your health and well-being. Your primary care provider is committed to ensuring you receive high quality care and has chosen a network of specialists to assist in providing that care. Recently, Dr. Ashwini Daley referred you to [facility] for specialty care     It is important to your overall health to follow through with the recommendation from your provider. Please call [phone] at your earliest convenience for assistance in scheduling an appointment.  If you have already scheduled this appointment, please disregard this notice.  Thank you for choosing [facility] for your healthcare needs.       Sincerely,       [facility] Specialty Scheduling

## 2021-06-20 NOTE — PROGRESS NOTES
Assessment and Plan:     1. Medicare annual wellness visit, subsequent    2. Late onset Alzheimer's disease with behavioral disturbance    3. Edema of both legs     Recommend compression stockings or tubigrip for control.       4. Other urinary incontinence     We will check UA/UC to rule out infection. May be related to progression of the dementia.   - Urinalysis Macroscopic; Future  - Culture, Urine; Future    5. Fluctuation of weight     On review of her weights over the last few years, it appears she has bounced between     6. Medication monitoring encounter  - Comprehensive Metabolic Panel   On review of her weights over the last few years, it appears she has bounced between 124 and 145, so this does not appear to be unusual especially given her god oral intake. If the weight loss persists, we would proceed with further workup for malignancy.      6. Medication monitoring encounter  - HM2(CBC w/o Differential)    The patient's current medical problems were reviewed.    I have had an Advance Directives discussion with the patient.  The following health maintenance schedule was reviewed with the patient and provided in printed form in the after visit summary:   Health Maintenance   Topic Date Due     DXA SCAN  06/03/2005     FALL RISK ASSESSMENT  06/21/2018     INFLUENZA VACCINE RULE BASED (1) 08/01/2018     TD 18+ HE  01/03/2022     ADVANCE DIRECTIVES DISCUSSED WITH PATIENT  06/21/2022     PNEUMOCOCCAL POLYSACCHARIDE VACCINE AGE 65 AND OVER  Completed     PNEUMOCOCCAL CONJUGATE VACCINE FOR ADULTS (PCV13 OR PREVNAR)  Completed     ZOSTER VACCINE  Completed        Subjective:   Chief Complaint: Dedra Benjamin is an 78 y.o. female here for an Annual Wellness visit.   HPI:    Patient presents today with her  Saqib.     They note urinary frequency, intermittent incontinence for last few mos. No dysuria, hematuria. No abd or back pain.      Memory issues are stable. She is living in memory care at [facility]  living.      Stopped some medications due to fatigue/drowsiness. Stopped namenda, reduced daytime seroquel. More talkative since the changes.      Review of Systems:    Eats well  Weight is down 10 lb in last 6 mos per nursing  Please see above.  The rest of the review of systems are negative for all systems.    Patient Care Team:  Ashwini Daley MD as PCP - General     Patient Active Problem List   Diagnosis     Ganglion Of The Flexor Sheath Of The Right Thumb     Alzheimer's dementia with behavioral disturbance     Vitamin B12 Deficiency     Localized Primary Osteoarthritis Of The Carpometacarpal Joint     Alzheimer's dementia with behavioral disturbance, unspecified timing of dementia onset     Fall, initial encounter     Past Medical History:   Diagnosis Date     Alzheimer's dementia with behavioral disturbance       Past Surgical History:   Procedure Laterality Date     OR BIOPSY OF BREAST, INCISIONAL      Description: Biopsy Breast Open;  Recorded: 01/02/2012;  Comments: 1990     OR REMOVAL OF TONSILS,<11 Y/O      Description: Tonsillectomy;  Recorded: 01/03/2012;  Comments: Age 5      Family History   Problem Relation Age of Onset     Bone cancer Sister       Social History     Social History     Marital status:      Spouse name: Saqib     Number of children: 2     Years of education: 16     Occupational History     retired      Social History Main Topics     Smoking status: Never Smoker     Smokeless tobacco: Not on file     Alcohol use Yes     Drug use: No     Sexual activity: Not Currently     Partners: Male     Other Topics Concern     Not on file     Social History Narrative      Current Outpatient Prescriptions   Medication Sig Dispense Refill     cyanocobalamin 1,000 mcg/mL injection Inject 1,000 mcg into the shoulder, thigh, or buttocks every 30 (thirty) days.        donepezil (ARICEPT) 10 MG tablet Take 15 mg by mouth bedtime.       LORazepam (ATIVAN) 0.5 MG tablet Take 0.5 mg by mouth  "every 12 (twelve) hours as needed for anxiety.       multivitamin therapeutic (THERAGRAN) tablet Take 1 tablet by mouth daily.       QUEtiapine (SEROQUEL) 100 MG tablet Take 100 mg by mouth at bedtime. 50 mg am and midday, 100 mg hs       traZODone (DESYREL) 50 MG tablet Take 50 mg by mouth at bedtime.       No current facility-administered medications for this visit.       Objective:   Vital Signs:   Visit Vitals     BP 96/54     Pulse 72     Ht 5' 7.75\" (1.721 m)     Wt 129 lb 1 oz (58.5 kg)     Breastfeeding No     BMI 19.77 kg/m2        VisionScreening:  No exam data present     PHYSICAL EXAM  General: alert, pleasant, and no distress  Head: Normocephalic, without obvious abnormality, atraumatic  Eyes: conjunctivae and sclerae normal and pupils equal, round, reactive to   light and accomodation  Ears: normal TM's and external ear canals both ears  Nose: no discharge, no sinus tenderness  Throat: lips, mucosa, and tongue normal; teeth and gums normal  Neck: no adenopathy, supple, symmetrical, trachea midline and thyroid normal  Back: symmetric, ROM normal. No CVA tenderness.  Lungs: clear to auscultation bilaterally  Breasts: exam deferred  Heart : regular rate and rhythm and no murmurs  Abdomen:  bowel sounds normal, no masses palpable and soft, non-tender  Pelvic: exam deferred   Extremities: extremities normal, atraumatic, no cyanosis or edema  Pulses: 2+ and symmetric  Skin: Skin color, texture, turgor normal. No rashes or lesions  Lymph nodes: Cervical, supraclavicular, and axillary nodes normal.  Neurologic: Grossly normal          Results for orders placed or performed in visit on 08/21/18   Comprehensive Metabolic Panel   Result Value Ref Range    Sodium 140 136 - 145 mmol/L    Potassium 4.4 3.5 - 5.0 mmol/L    Chloride 105 98 - 107 mmol/L    CO2 27 22 - 31 mmol/L    Anion Gap, Calculation 8 5 - 18 mmol/L    Glucose 107 70 - 125 mg/dL    BUN 11 8 - 28 mg/dL    Creatinine 0.80 0.60 - 1.10 mg/dL    GFR MDRD " Af Amer >60 >60 mL/min/1.73m2    GFR MDRD Non Af Amer >60 >60 mL/min/1.73m2    Bilirubin, Total 0.5 0.0 - 1.0 mg/dL    Calcium 9.7 8.5 - 10.5 mg/dL    Protein, Total 6.5 6.0 - 8.0 g/dL    Albumin 3.9 3.5 - 5.0 g/dL    Alkaline Phosphatase 56 45 - 120 U/L    AST 18 0 - 40 U/L    ALT 17 0 - 45 U/L   HM2(CBC w/o Differential)   Result Value Ref Range    WBC 4.3 4.0 - 11.0 thou/uL    RBC 4.21 3.80 - 5.40 mill/uL    Hemoglobin 12.0 12.0 - 16.0 g/dL    Hematocrit 35.5 35.0 - 47.0 %    MCV 84 80 - 100 fL    MCH 28.5 27.0 - 34.0 pg    MCHC 33.8 32.0 - 36.0 g/dL    RDW 14.5 11.0 - 14.5 %    Platelets 240 140 - 440 thou/uL    MPV 7.8 7.0 - 10.0 fL        No flowsheet data found.  A Mini-Cog score of 0-2 suggests the possibility of dementia, score of 3-5 suggests no dementia    Identified Health Risks:

## 2021-06-23 NOTE — TELEPHONE ENCOUNTER
Fax received from [employer] , they have started the Prior Authorization Process via [employer]    CoverMyMeds Key: [id number]    Medication Name: unknown    Insurance Plan: [employer]   PBM: [employer]   Patient ID: [id number]    Please complete the PA process

## 2021-06-24 NOTE — TELEPHONE ENCOUNTER
Medication Question or Clarification  Who is calling: Meaghan  What medication are you calling about?: Quetiapine   What dose do you take?: 50mg  How often are you taking the medication?: One in the morning and one at noon  Who prescribed the medication?: This was on her medication list Meaghan received from patient's previous memory care unit.  Meaghan is moving into [facility] in the next few days.  What is your question/concern?: The medication list from Ashwini Daley MD notes quetiapine 100mg q hs but does not note the two day times doses.  Is patient suppose to be taking this.  Pharmacy: n/a.  Clarification only needed  Okay to leave a detailed message?: Yes  Site CMT - Please call the pharmacy to obtain any additional needed information.

## 2021-06-24 NOTE — TELEPHONE ENCOUNTER
Referral Request  Type of referral: Neurosurgery  Who s requesting: Patient's   Why the request: Patient's insurance recently changed, and she has been receiving care from Dr. Cheney for dementia.   Have you been seen for this request: Yes  Does patient have a preference on a group/provider? Dr Cheney  Okay to leave a detailed message?  Yes

## 2021-06-24 NOTE — TELEPHONE ENCOUNTER
The medication appears to be listed as prn. I do not have information from [facility] that she has been taking it. Could we ask them to fax over the MAR to see how often she has been getting it? Please send message back to me so that I can follow up on this.

## 2021-06-24 NOTE — TELEPHONE ENCOUNTER
Message left on [facility] nurse line requesting a fax of medications.     H.DeGree, CMA

## 2021-06-24 NOTE — TELEPHONE ENCOUNTER
Spouse dropped off Polst form in Dr Shaffer inbox spouse seeing Dr Madden right now  would if possible to take it with him .

## 2021-06-24 NOTE — TELEPHONE ENCOUNTER
FYI reporting impending transfer to [facility].  
Received a fax from [facility] [employer]; [facility].    Fax is in Dr Daley's inbox.    2/19/2019  
with RW

## 2021-06-24 NOTE — TELEPHONE ENCOUNTER
Received a fax from [place] [facility].    Fax is in Dr Daley's inbox.    2/26/2019

## 2021-06-24 NOTE — TELEPHONE ENCOUNTER
Patient Returning Call  Reason for call:  Return call  Information relayed to patient:  Caller stated that she is going to [place] on 2/25 to get an updated medication list. Caller stated that she will call us back after she talks with [place] [facility].  Patient has additional questions:  No  If YES, what are your questions/concerns:  n/a  Okay to leave a detailed message?: No call back needed

## 2021-06-24 NOTE — TELEPHONE ENCOUNTER
Received a fax from [facility] [employer] [facility].    Fax is in Dr Daley's inbox.    3/1/2019

## 2021-07-21 ENCOUNTER — RECORDS - HEALTHEAST (OUTPATIENT)
Dept: ADMINISTRATIVE | Facility: CLINIC | Age: 81
End: 2021-07-21

## 2023-07-28 NOTE — PROGRESS NOTES
"Assessment:         1. Late onset Alzheimer's disease with behavioral disturbance     2. Weight loss  HM2(CBC w/o Differential)    Thyroid Foreman    Comprehensive Metabolic Panel            Plan:          Non-fasting labs were drawn. We discussed potential etiologies for her weight loss, and it may merely be a function of her dementia progressing, but we will check labs to rule out obvious pathology. Blood pressure is under excellent control. We reviewed her current medications and she will continue the same pending additional lab results. We reviewed dietary recommendations, and I will have them add ensure or boost supplements at this time. She will plan to follow up in 4-6 mos for repeat fasting labs and med check, sooner if any difficulties.         Subjective:       Dedra is here today with her , Saqib.         Patient is here for evaluation of weight loss, 12 lb by our scale since August. She had lost about 20 lb prior to that. She has fairly significant dementia and lives at [facility] in their memory care unit. She does walk a lot, paces but that has not changed recently. Her appetite seems ok. They deny pain complaints and she has had no recent medication changes. No recent cough, fever, chills or diarrhea.    The following portions of the patient's history were reviewed and updated as appropriate: allergies, current medications, past family history, past medical history, past social history, past surgical history and problem list.    Review of Systems  Review of systems not obtained due to inability to communicate with the patient.         Objective:        Vitals:    12/28/18 0942   BP: 115/60   Pulse: 64   Weight: 117 lb (53.1 kg)   Height: 5' 7.75\" (1.721 m)          General:    Pleasant, cooperative, no distress. Not oriented to self, date or place.    Head:    Normocephalic, without obvious abnormality, atraumatic   Eyes:    PERRL, conjunctiva/corneas clear, EOM's intact    Ears:    Normal " TM's and external ear canals, both ears   Nose:   Nares normal, mucosa normal, no drainage or sinus tenderness   Throat:   Lips, mucosa, and tongue normal; teeth and gums normal   Neck:   Supple, symmetrical,  no adenopathy;  thyroid:  normal   Back:     Symmetric, ROM normal, no CVA tenderness   Lungs:     Clear to auscultation bilaterally, respirations unlabored   CV:    Regular rate and rhythm   Abdomen:     Soft, non-tender, no masses, no organomegaly   Extremities:   Extremities normal, atraumatic, no cyanosis or edema   Pulses:   2+ and symmetric all extremities   Skin:   Skin color, texture, turgor normal, no rashes or lesions   Neurologic:   normal strength and tone throughout         Results for orders placed or performed in visit on 12/28/18   HM2(CBC w/o Differential)   Result Value Ref Range    WBC 4.5 4.0 - 11.0 thou/uL    RBC 4.00 3.80 - 5.40 mill/uL    Hemoglobin 11.9 (L) 12.0 - 16.0 g/dL    Hematocrit 35.9 35.0 - 47.0 %    MCV 90 80 - 100 fL    MCH 29.8 27.0 - 34.0 pg    MCHC 33.2 32.0 - 36.0 g/dL    RDW 12.0 11.0 - 14.5 %    Platelets 258 140 - 440 thou/uL    MPV 7.3 7.0 - 10.0 fL   Thyroid Cascade   Result Value Ref Range    TSH 2.72 0.30 - 5.00 uIU/mL   Comprehensive Metabolic Panel   Result Value Ref Range    Sodium 138 136 - 145 mmol/L    Potassium 4.3 3.5 - 5.0 mmol/L    Chloride 101 98 - 107 mmol/L    CO2 29 22 - 31 mmol/L    Anion Gap, Calculation 8 5 - 18 mmol/L    Glucose 102 70 - 125 mg/dL    BUN 16 8 - 28 mg/dL    Creatinine 0.74 0.60 - 1.10 mg/dL    GFR MDRD Af Amer >60 >60 mL/min/1.73m2    GFR MDRD Non Af Amer >60 >60 mL/min/1.73m2    Bilirubin, Total 0.6 0.0 - 1.0 mg/dL    Calcium 9.8 8.5 - 10.5 mg/dL    Protein, Total 6.7 6.0 - 8.0 g/dL    Albumin 3.9 3.5 - 5.0 g/dL    Alkaline Phosphatase 71 45 - 120 U/L    AST 21 0 - 40 U/L    ALT 16 0 - 45 U/L       (4) no limitation